# Patient Record
Sex: FEMALE | Race: WHITE | NOT HISPANIC OR LATINO | Employment: FULL TIME | URBAN - METROPOLITAN AREA
[De-identification: names, ages, dates, MRNs, and addresses within clinical notes are randomized per-mention and may not be internally consistent; named-entity substitution may affect disease eponyms.]

---

## 2021-04-20 ENCOUNTER — IMMUNIZATION (OUTPATIENT)
Dept: FAMILY PLANNING/WOMEN'S HEALTH CLINIC | Facility: IMMUNIZATION CENTER | Age: 56
End: 2021-04-20
Payer: COMMERCIAL

## 2021-04-20 DIAGNOSIS — Z23 ENCOUNTER FOR VACCINATION: Primary | ICD-10-CM

## 2021-04-20 PROCEDURE — 0001A PFIZER SARS-COV-2 VACCINE: CPT

## 2021-04-20 PROCEDURE — 91300 PFIZER SARS-COV-2 VACCINE: CPT

## 2021-05-13 ENCOUNTER — IMMUNIZATION (OUTPATIENT)
Dept: FAMILY PLANNING/WOMEN'S HEALTH CLINIC | Facility: IMMUNIZATION CENTER | Age: 56
End: 2021-05-13
Attending: INTERNAL MEDICINE
Payer: COMMERCIAL

## 2021-05-13 DIAGNOSIS — Z23 ENCOUNTER FOR VACCINATION: Primary | ICD-10-CM

## 2021-05-13 PROCEDURE — 91300 PFIZER SARS-COV-2 VACCINE: CPT | Performed by: INTERNAL MEDICINE

## 2021-05-13 PROCEDURE — 0002A PFIZER SARS-COV-2 VACCINE: CPT | Performed by: INTERNAL MEDICINE

## 2025-07-28 ENCOUNTER — APPOINTMENT (OUTPATIENT)
Dept: MEDICAL GROUP | Facility: MEDICAL CENTER | Age: 60
End: 2025-07-28
Payer: COMMERCIAL

## 2025-07-28 VITALS
HEIGHT: 68 IN | HEART RATE: 79 BPM | RESPIRATION RATE: 14 BRPM | TEMPERATURE: 98.3 F | WEIGHT: 195.55 LBS | SYSTOLIC BLOOD PRESSURE: 116 MMHG | BODY MASS INDEX: 29.64 KG/M2 | DIASTOLIC BLOOD PRESSURE: 80 MMHG | OXYGEN SATURATION: 95 %

## 2025-07-28 DIAGNOSIS — Z82.49 FAMILY HISTORY OF BLOOD CLOTS: Primary | ICD-10-CM

## 2025-07-28 DIAGNOSIS — Z11.59 ENCOUNTER FOR HEPATITIS C SCREENING TEST FOR LOW RISK PATIENT: ICD-10-CM

## 2025-07-28 DIAGNOSIS — E78.00 ELEVATED LDL CHOLESTEROL LEVEL: ICD-10-CM

## 2025-07-28 DIAGNOSIS — E66.9 OBESITY (BMI 30-39.9): ICD-10-CM

## 2025-07-28 DIAGNOSIS — H91.92: ICD-10-CM

## 2025-07-28 DIAGNOSIS — K63.5 POLYP OF COLON, UNSPECIFIED PART OF COLON, UNSPECIFIED TYPE: ICD-10-CM

## 2025-07-28 RX ORDER — ASPIRIN 81 MG/1
81 TABLET ORAL DAILY
COMMUNITY

## 2025-07-28 ASSESSMENT — ENCOUNTER SYMPTOMS
WEIGHT LOSS: 0
NAUSEA: 0
SHORTNESS OF BREATH: 0
CHILLS: 0
HEADACHES: 0
DIZZINESS: 0
WHEEZING: 0
PALPITATIONS: 0
VOMITING: 0
FEVER: 0

## 2025-07-28 ASSESSMENT — PATIENT HEALTH QUESTIONNAIRE - PHQ9: CLINICAL INTERPRETATION OF PHQ2 SCORE: 0

## 2025-07-28 NOTE — PROGRESS NOTES
Subjective:     CC:  The primary encounter diagnosis was Family history of blood clots. Diagnoses of Elevated LDL cholesterol level, Partial hearing loss, left, Polyp of colon, unspecified part of colon, unspecified type, Obesity (BMI 30-39.9), and Encounter for hepatitis C screening test for low risk patient were also pertinent to this visit.    HISTORY OF THE PRESENT ILLNESS: Patient is a 59 y.o. female. This pleasant patient is here today to establish care and discuss     Pmh HLD, hx of nephrolithiasis, family history of blood clot in daughter ( patient has negative hypercoagulability panel), on primary prevention asa    Verbal consent was acquired by the patient to use [employer] ambient listening note generation during this visit Yes   History of Present Illness  The patient presents for a health maintenance visit.    She has a history of kidney stones and parathyroid issues but is currently not on any medication, only taking vitamins. She reports no current problems with her parathyroid or calcium levels. She has no personal history of blood clots, although her daughter has had them. She underwent blood work to check for hereditary conditions, which came back negative. She is taking aspirin due to her family history of blood clots. Her last colonoscopy was on 05/17/2024, during which precancerous polyps were found, necessitating more frequent screenings every 5 years. Her last Pap smear was about 1.5 years ago, with no abnormalities reported. She has received all recommended vaccines, including the pneumococcal vaccine last year. She does not feel depressed or hopeless. She maintains an active lifestyle and sees her primary care doctor annually. She had a CT scan in 2019, which revealed multiple hepatic cysts and severe pectus deformity of the anterior chest. She also had an ultrasound of the breast approximately 16 years ago.    She experienced total hearing loss in her left ear ~2015, which was partially  "restored after treatment with steroids.     She has a history of elevated LDL cholesterol levels.    She used to have vertigo issues but has been feeling well recently. She broke her wrist about 2 years ago.    Alcohol: The patient drinks alcohol on occasions.  Tobacco: The patient does not smoke cigarettes.  Sexual Practices: The patient has one partner.    PAST SURGICAL HISTORY:  - Procedure to remove kidney stones  - Wrist fracture treatment 2 years ago    FAMILY HISTORY  Her daughter had blood clots and an embolism at 24 years old.  Her mother had colon cancer.  Her sister had breast cancer.          Health Maintenance:     ROS:   Review of Systems   Constitutional:  Negative for chills, fever and weight loss.   HENT:  Negative for hearing loss.    Respiratory:  Negative for shortness of breath and wheezing.    Cardiovascular:  Negative for chest pain and palpitations.   Gastrointestinal:  Negative for nausea and vomiting.   Genitourinary:  Negative for frequency and urgency.   Skin:  Negative for rash.   Neurological:  Negative for dizziness and headaches.         Objective:       Exam: /80 (BP Location: Left arm, Patient Position: Sitting, BP Cuff Size: Adult)   Pulse 79   Temp 36.8 °C (98.3 °F) (Temporal)   Resp 14   Ht 1.718 m (5' 7.64\")   Wt 88.7 kg (195 lb 8.8 oz)   SpO2 95%  Body mass index is 30.05 kg/m².    Physical Exam  Constitutional:       Appearance: Normal appearance.   Cardiovascular:      Rate and Rhythm: Normal rate and regular rhythm.      Heart sounds: No murmur heard.  Pulmonary:      Effort: Pulmonary effort is normal.      Breath sounds: Normal breath sounds. No wheezing.   Musculoskeletal:      Cervical back: Normal range of motion and neck supple.   Lymphadenopathy:      Cervical: No cervical adenopathy.   Neurological:      Mental Status: She is alert.         Labs:   Results  Labs   - LDL Cholesterol: 11/2024, LDL > 100  - hypercoagulability panel negative        Assessment " & Plan:   59 y.o. female with the following -    1. Family history of blood clots (Primary)  See hpi for detail  She is on primary prevention asa    2. Elevated LDL cholesterol level  Chronic stable  Continue healthy diet and exercise  - Lipid Profile; Future  - TSH WITH REFLEX TO FT4; Future    3. Partial hearing loss, left  History of     4. Polyp of colon, unspecified part of colon, unspecified type  Chronic stable  Fhx of colon cancer  Asymptomatic  Up to date with colonoscopy  - CBC WITHOUT DIFFERENTIAL; Future    5. Obesity (BMI 30-39.9)    - Patient identified as having weight management issue.  Appropriate orders and counseling given.  - CBC WITHOUT DIFFERENTIAL; Future  - Lipid Profile; Future  - TSH WITH REFLEX TO FT4; Future  - HEMOGLOBIN A1C; Future  - Comp Metabolic Panel; Future    6. Encounter for hepatitis C screening test for low risk patient    - HEP C VIRUS ANTIBODY; Future    Assessment & Plan  1. Health maintenance.  - Blood pressure readings are within normal range.  - History of kidney stones and parathyroid issues, currently not on any medication, only taking vitamins.  - Last blood work conducted in 11/2024, overall satisfactory results.  - Family history of blood clots in daughter, hypercoagulability panel returned negative results.  - BMI elevated, maintaining a healthy lifestyle and staying active.  - Last colonoscopy on 05/17/2024, next one due in 2029.  - Last Pap smear about 1.5 years ago, next one needed in 2 years.  - Up to date with mammogram screenings.  - Standard blood work ordered for completion by end of the year, including hepatitis C screening.  - Advised to continue regimen of drinking plenty of water and taking calcium supplements in moderation.    2. Partial hearing loss.  - History of partial hearing loss around 2015, responsive to prednisone treatment.  - MRI IAC showed no mass or abnormalities.  - Followed up with ENT specialist.    3. Elevated LDL cholesterol.  -  History of elevated LDL cholesterol levels in the 130s, considered acceptable given gender and other factors.  - LDL cholesterol levels to be rechecked after a year to establish a new baseline.    Follow-up: Annual wellness visit.          Return in about 1 year (around 7/28/2026) for Annual wellness visit.    Please note that this dictation was created using voice recognition software. I have made every reasonable attempt to correct obvious errors, but I expect that there are errors of grammar and possibly content that I did not discover before finalizing the note.